# Patient Record
Sex: FEMALE | Race: BLACK OR AFRICAN AMERICAN | Employment: FULL TIME | URBAN - METROPOLITAN AREA
[De-identification: names, ages, dates, MRNs, and addresses within clinical notes are randomized per-mention and may not be internally consistent; named-entity substitution may affect disease eponyms.]

---

## 2021-12-20 ENCOUNTER — OFFICE VISIT (OUTPATIENT)
Dept: INTERNAL MEDICINE CLINIC | Facility: CLINIC | Age: 51
End: 2021-12-20
Payer: COMMERCIAL

## 2021-12-20 VITALS
BODY MASS INDEX: 36.26 KG/M2 | SYSTOLIC BLOOD PRESSURE: 140 MMHG | HEIGHT: 68.5 IN | RESPIRATION RATE: 14 BRPM | HEART RATE: 78 BPM | DIASTOLIC BLOOD PRESSURE: 88 MMHG | WEIGHT: 242 LBS

## 2021-12-20 DIAGNOSIS — E53.8 VITAMIN B12 DEFICIENCY: ICD-10-CM

## 2021-12-20 DIAGNOSIS — I10 ESSENTIAL HYPERTENSION: ICD-10-CM

## 2021-12-20 DIAGNOSIS — Z51.81 ENCOUNTER FOR THERAPEUTIC DRUG MONITORING: Primary | ICD-10-CM

## 2021-12-20 DIAGNOSIS — E66.01 SEVERE OBESITY (BMI 35.0-39.9) WITH COMORBIDITY (HCC): ICD-10-CM

## 2021-12-20 DIAGNOSIS — E78.00 ELEVATED LDL CHOLESTEROL LEVEL: ICD-10-CM

## 2021-12-20 PROCEDURE — 3008F BODY MASS INDEX DOCD: CPT | Performed by: NURSE PRACTITIONER

## 2021-12-20 PROCEDURE — 3077F SYST BP >= 140 MM HG: CPT | Performed by: NURSE PRACTITIONER

## 2021-12-20 PROCEDURE — 99204 OFFICE O/P NEW MOD 45 MIN: CPT | Performed by: NURSE PRACTITIONER

## 2021-12-20 PROCEDURE — 3079F DIAST BP 80-89 MM HG: CPT | Performed by: NURSE PRACTITIONER

## 2021-12-20 RX ORDER — EPINEPHRINE 0.3 MG/.3ML
0.3 INJECTION SUBCUTANEOUS
COMMUNITY
Start: 2020-12-28

## 2021-12-20 RX ORDER — PROGESTERONE 200 MG/1
200 CAPSULE ORAL DAILY
COMMUNITY
Start: 2021-08-17

## 2021-12-20 RX ORDER — SEMAGLUTIDE 0.25 MG/.5ML
0.25 INJECTION, SOLUTION SUBCUTANEOUS WEEKLY
Qty: 2 ML | Refills: 0 | COMMUNITY
Start: 2021-12-20 | End: 2022-01-11

## 2021-12-20 RX ORDER — SEMAGLUTIDE 0.5 MG/.5ML
0.5 INJECTION, SOLUTION SUBCUTANEOUS WEEKLY
Qty: 2 ML | Refills: 0 | Status: SHIPPED | OUTPATIENT
Start: 2021-12-20 | End: 2022-01-11

## 2021-12-20 NOTE — PROGRESS NOTES
HISTORY OF PRESENT ILLNESS  Patient presents with:  Weight Problem: Patient referred by current patient, never tried weight loss medications      Loida Hauser is a 46year old female new to our office today for initiation of medical weight loss program. personal history of Pancreatic issues / Medullary Thyroid Cancer: negative  History of bariatric surgery: negative    [address] reviewed: obesity in parent/s or sibling: yes    REVIEW OF SYSTEMS  GENERAL: feels well otherwise  SKIN: denies any rashes to skin fol Simi CALCNONHDL  No results found for: T4F, TSH, TSHT4  No results found for: B12, VITB12  No results found for: VITD, QVITD, QATO80NO    EPINEPHrine 0.3 MG/0.3ML Injection Solution Auto-injector, 0.3 mg., Disp: , Rfl:   progesterone 200 MG Oral Cap, T semaglutide-weight management (WEGOVY) 0.5 MG/0.5ML Subcutaneous Solution Auto-injector; Inject 0.5 mL (0.5 mg total) into the skin once a week for 4 doses.  Finish sample starter month then start this next dose    Essential hypertension  - BP borderline to monthly if tolerated. Use sample start for first and then increase to next dose I sent to your pharmacy. If cost prohibitive finish monthly sample and will review plan B at next follow up.       Please try to work on the following dietary changes this first able with long-term goal of 30 minutes 5 days a week at a minimum. Meditation daily can help manage and control stress. Chronic stress can make weight loss difficult.   Exercising is one way to help with stress, but meditation using the CALM Sage or ano

## 2021-12-20 NOTE — PATIENT INSTRUCTIONS
Welcome to the [facility]...your Lifestyle Renovation begins now! Thank you for placing your trust in our health care team, I look forward to working with you along this journey to better health!     Next steps:     1.  Sched Capsules can be left out for 2 weeks, but then must be refrigerated.      Please download jade MyFitness Pal or [employer]! to monitor daily dietary intake and you will be able to see if you are eating the right amount of calories or too much or too little which

## 2021-12-20 NOTE — PROGRESS NOTES
Patient teaching on [facility]MA[facility] performed. Patient demonstrated back, all questions were answered and patient verbalized understanding.

## 2021-12-22 ENCOUNTER — TELEPHONE (OUTPATIENT)
Dept: INTERNAL MEDICINE CLINIC | Facility: CLINIC | Age: 51
End: 2021-12-22

## 2021-12-22 DIAGNOSIS — Z51.81 ENCOUNTER FOR THERAPEUTIC DRUG MONITORING: Primary | ICD-10-CM

## 2021-12-22 DIAGNOSIS — E66.01 SEVERE OBESITY (BMI 35.0-39.9) WITH COMORBIDITY (HCC): ICD-10-CM

## 2021-12-22 NOTE — TELEPHONE ENCOUNTER
Request from [facility] to do PA for Wegovy 0.5 mg on CMM  Id listed in [facility] [id number]  Form received on fax from [facility]    Form filled out  Needs to be faxed with last office note

## 2022-01-06 NOTE — TELEPHONE ENCOUNTER
I tried calling # given by pharmacy [phone] to check on status of this PA and it says it is [place]. I ask to speak to rep and it disconnects my call. The # listed is for pharmacy only.    I called pharmacy back to try and obtain # to call, they do n

## 2022-01-06 NOTE — TELEPHONE ENCOUNTER
I called [facility] with # provided by patient. They have no PA we sent. She said she would do with me on the phone. It comes up as plan exclusion. Cannot do pa. She cannot tell me if anything else is covered. How do you want to proceed?

## 2022-01-06 NOTE — TELEPHONE ENCOUNTER
Please notify patient she can switch to Phentermine 15 mg, sig: Take 1 tab daily in the AM #30 with 1 refill and Topamax 25 mg, sig: Take 1 tab daily for 7 days, then increase to 1 tab twice a day thereafter. #60 with 1 refill.  This will help hunger and cr

## 2022-01-08 ENCOUNTER — LAB ENCOUNTER (OUTPATIENT)
Dept: LAB | Age: 52
End: 2022-01-08
Attending: NURSE PRACTITIONER
Payer: COMMERCIAL

## 2022-01-08 DIAGNOSIS — I10 ESSENTIAL HYPERTENSION: ICD-10-CM

## 2022-01-08 DIAGNOSIS — E53.8 VITAMIN B12 DEFICIENCY: ICD-10-CM

## 2022-01-08 DIAGNOSIS — Z51.81 ENCOUNTER FOR THERAPEUTIC DRUG MONITORING: ICD-10-CM

## 2022-01-08 DIAGNOSIS — E66.01 SEVERE OBESITY (BMI 35.0-39.9) WITH COMORBIDITY (HCC): ICD-10-CM

## 2022-01-08 LAB
ALBUMIN SERPL-MCNC: 3.7 G/DL (ref 3.4–5)
ALBUMIN/GLOB SERPL: 1.1 {RATIO} (ref 1–2)
ALP LIVER SERPL-CCNC: 84 U/L
ALT SERPL-CCNC: 21 U/L
ANION GAP SERPL CALC-SCNC: 6 MMOL/L (ref 0–18)
AST SERPL-CCNC: 16 U/L (ref 15–37)
BILIRUB SERPL-MCNC: 0.4 MG/DL (ref 0.1–2)
BUN BLD-MCNC: 13 MG/DL (ref 7–18)
CALCIUM BLD-MCNC: 9.8 MG/DL (ref 8.5–10.1)
CHLORIDE SERPL-SCNC: 105 MMOL/L (ref 98–112)
CO2 SERPL-SCNC: 28 MMOL/L (ref 21–32)
CREAT BLD-MCNC: 1.27 MG/DL
FASTING STATUS PATIENT QL REPORTED: NO
GLOBULIN PLAS-MCNC: 3.4 G/DL (ref 2.8–4.4)
GLUCOSE BLD-MCNC: 104 MG/DL (ref 70–99)
INSULIN SERPL-ACNC: 35.6 MU/L (ref 3–25)
OSMOLALITY SERPL CALC.SUM OF ELEC: 288 MOSM/KG (ref 275–295)
POTASSIUM SERPL-SCNC: 4.1 MMOL/L (ref 3.5–5.1)
PROT SERPL-MCNC: 7.1 G/DL (ref 6.4–8.2)
SODIUM SERPL-SCNC: 139 MMOL/L (ref 136–145)
VIT B12 SERPL-MCNC: 334 PG/ML (ref 193–986)

## 2022-01-08 PROCEDURE — 83525 ASSAY OF INSULIN: CPT | Performed by: NURSE PRACTITIONER

## 2022-01-08 PROCEDURE — 80053 COMPREHEN METABOLIC PANEL: CPT | Performed by: NURSE PRACTITIONER

## 2022-01-08 PROCEDURE — 82607 VITAMIN B-12: CPT | Performed by: NURSE PRACTITIONER

## 2022-01-11 RX ORDER — PHENTERMINE HYDROCHLORIDE 15 MG/1
15 CAPSULE ORAL EVERY MORNING
Qty: 30 CAPSULE | Refills: 1 | Status: SHIPPED | OUTPATIENT
Start: 2022-01-11

## 2022-01-11 RX ORDER — TOPIRAMATE 25 MG/1
TABLET ORAL
Qty: 60 TABLET | Refills: 1 | Status: SHIPPED | OUTPATIENT
Start: 2022-01-11

## 2022-01-22 ENCOUNTER — LAB ENCOUNTER (OUTPATIENT)
Dept: LAB | Age: 52
End: 2022-01-22
Attending: NURSE PRACTITIONER
Payer: COMMERCIAL

## 2022-01-22 DIAGNOSIS — E66.01 SEVERE OBESITY (BMI 35.0-39.9) WITH COMORBIDITY (HCC): ICD-10-CM

## 2022-01-22 DIAGNOSIS — Z51.81 ENCOUNTER FOR THERAPEUTIC DRUG MONITORING: ICD-10-CM

## 2022-01-22 LAB
EST. AVERAGE GLUCOSE BLD GHB EST-MCNC: 108 MG/DL (ref 68–126)
HBA1C MFR BLD: 5.4 % (ref ?–5.7)

## 2022-01-22 PROCEDURE — 36415 COLL VENOUS BLD VENIPUNCTURE: CPT

## 2022-01-22 PROCEDURE — 83036 HEMOGLOBIN GLYCOSYLATED A1C: CPT

## 2022-01-24 ENCOUNTER — OFFICE VISIT (OUTPATIENT)
Dept: INTERNAL MEDICINE CLINIC | Facility: CLINIC | Age: 52
End: 2022-01-24
Payer: COMMERCIAL

## 2022-01-24 VITALS
DIASTOLIC BLOOD PRESSURE: 86 MMHG | HEIGHT: 68.5 IN | HEART RATE: 80 BPM | BODY MASS INDEX: 35.06 KG/M2 | WEIGHT: 234 LBS | RESPIRATION RATE: 16 BRPM | SYSTOLIC BLOOD PRESSURE: 138 MMHG

## 2022-01-24 DIAGNOSIS — E53.8 VITAMIN B12 DEFICIENCY: ICD-10-CM

## 2022-01-24 DIAGNOSIS — E66.01 SEVERE OBESITY (BMI 35.0-39.9) WITH COMORBIDITY (HCC): ICD-10-CM

## 2022-01-24 DIAGNOSIS — I10 ESSENTIAL HYPERTENSION: ICD-10-CM

## 2022-01-24 DIAGNOSIS — Z51.81 ENCOUNTER FOR THERAPEUTIC DRUG MONITORING: Primary | ICD-10-CM

## 2022-01-24 PROCEDURE — 3075F SYST BP GE 130 - 139MM HG: CPT | Performed by: NURSE PRACTITIONER

## 2022-01-24 PROCEDURE — 3008F BODY MASS INDEX DOCD: CPT | Performed by: NURSE PRACTITIONER

## 2022-01-24 PROCEDURE — 3079F DIAST BP 80-89 MM HG: CPT | Performed by: NURSE PRACTITIONER

## 2022-01-24 PROCEDURE — 99214 OFFICE O/P EST MOD 30 MIN: CPT | Performed by: NURSE PRACTITIONER

## 2022-01-24 NOTE — PROGRESS NOTES
Latonya Iqbal is a 45year old female presents today for 1 month follow-up on medical weight loss program for the treatment of overweight, obesity, or morbid obesity with associated HTN, sub optimal Vitamin B12.     S:  Current weight Wt Readings from [facility] RRR without murmur, normal S1 and S2 without clicks or gallops, no pedal edema.   GI: +BS  NEURO/MS: motor and sensory grossly intact  PSYCH: pleasant, cooperative, normal mood and affect    ASSESSMENT AND PLAN:  Reviewed Initial Weight Data and Goal Weight perimenopausal women was about five pounds; however, 20% of the population they studied gained 10 pounds or more. Not only is the weight increase from a drop in estrogen but it’s also because of a decrease in energy expenditure.  Some women may notice an ov weight gain a modifiable risk factor  So, you may be thinking—I’m destined for failure! But this isn’t true. Although the risk of weight gain as a middle-aged woman is higher, this does not mean that it is required.  It does mean that we may have to work a sleep in order to keep your hormones and appetite under control. Get support and learn to cope without food. Many women (and men) admit to eating under stress. And, let’s face it, middle age can bring some tough times.  Children are often departing from t can be challenging, but you DO have power over your thoughts. Try these tips to rise above them and boost self-confidence. 1 – Imagine Saying to Other People What You Say to Yourself.  It can help you realize the harshness of your inner critic and how untr between being stagnant and pushing forward!     Patient Resources:    Personal Training/Fitness Classes/Health Coaching    · [address] @ [email or web address] Full fitness center with group fi [email or web address] com  · Diet Doctor @ www. dietdoctor. com - low carb swaps  · [employer] - meal prep and planning jade    Stress Management/Behavior/Mindful Eating  · CALM meditation jade  · Headspace  · Am I Hungry?  Mindful eating virtual  jade  · Merleen Meigs Sergio            Medication use and SEs reviewed with patient. Return in about 6 weeks (around 3/7/2022) for weight management via clinic. Patient verbalizes understanding.     DOCUMENTATION OF TIME SPENT: Code selection for this visit was based on sussy

## 2022-01-24 NOTE — PATIENT INSTRUCTIONS
Continue making lifestyle changes that focus on good nutrition, regular exercise and stress management. Medication Plan: Continue current medication regimen. Add over the counter Vitamin B12 1000 mcg daily.     Next steps to work on before next office vi diseases. Another factor contributing to weight gain in perimenopause may be the increased appetite and calorie intake that occurs in response to hormone changes.  In one study, levels of the “hunger hormone” ghrelin were found to be significantly higher back on carbs in order to reduce the increase in belly fat, which drives metabolic problems   Add fiber. Eat a high-fiber diet that includes flaxseeds, which may improve insulin sensitivity. Work out.  Engage in strength training to improve body compositi inner dialogue repeating any of these self-sabotaging statements? • “I'll never be able to reach my weight goals.  It's too hard.”  • “I blame myself for my weight and now I can't do anything about it.”  • “I am 'less than' because of my weight.”  Challeng recognize the lies that your inner critic is telling you. 7 – Look at the [employer].  If you're struggling with something, make an effort to recognize other factors that might be getting in the way — factors that have nothing to do with your aptitude Kitchen Workout [email or web address]    Nutrition Trackers and Tools  · LoseIT!  And My Fitness Pal apps and on line for tracking nutrition  · NOOM - virtual health coaching  · FitFoundation (healthy meals on the go) in [facility] Utube)  · The Truth About Sugar - documentary on sugar (Free on Utube, https://youtu. be/7Q3nlyjBT2d)  · The Dr. Rao Nail by Dr. Rona Cunningham MD  · Fitlosophy Fitspiration - journal to better health (found at [employer] in fitness aisle)  · What

## 2023-01-19 ENCOUNTER — APPOINTMENT (OUTPATIENT)
Dept: URBAN - METROPOLITAN AREA CLINIC 248 | Age: 53
Setting detail: DERMATOLOGY
End: 2023-01-19

## 2023-01-19 DIAGNOSIS — Z41.1 ENCOUNTER FOR COSMETIC SURGERY: ICD-10-CM

## 2023-01-19 PROCEDURE — OTHER CONSULTATION - BREAST AUGMENTATION: OTHER

## 2023-01-19 ASSESSMENT — LOCATION SIMPLE DESCRIPTION DERM: LOCATION SIMPLE: RIGHT BREAST

## 2023-01-19 ASSESSMENT — LOCATION DETAILED DESCRIPTION DERM
LOCATION DETAILED: RIGHT NIPPLE
LOCATION DETAILED: RIGHT MEDIAL BREAST 2-3:00 REGION

## 2023-01-19 ASSESSMENT — LOCATION ZONE DERM: LOCATION ZONE: TRUNK
